# Patient Record
Sex: MALE | Race: WHITE | Employment: PART TIME | URBAN - METROPOLITAN AREA
[De-identification: names, ages, dates, MRNs, and addresses within clinical notes are randomized per-mention and may not be internally consistent; named-entity substitution may affect disease eponyms.]

---

## 2018-08-27 ENCOUNTER — HOSPITAL ENCOUNTER (EMERGENCY)
Age: 48
Discharge: HOME | End: 2018-08-27
Payer: COMMERCIAL

## 2018-08-27 ENCOUNTER — APPOINTMENT (OUTPATIENT)
Dept: GENERAL RADIOLOGY | Age: 48
End: 2018-08-27
Payer: COMMERCIAL

## 2018-08-27 VITALS
HEIGHT: 70 IN | HEART RATE: 87 BPM | TEMPERATURE: 98.6 F | DIASTOLIC BLOOD PRESSURE: 89 MMHG | BODY MASS INDEX: 27.06 KG/M2 | OXYGEN SATURATION: 98 % | WEIGHT: 189 LBS | SYSTOLIC BLOOD PRESSURE: 142 MMHG | RESPIRATION RATE: 16 BRPM

## 2018-08-27 DIAGNOSIS — M54.50 ACUTE RIGHT-SIDED LOW BACK PAIN WITHOUT SCIATICA: Primary | ICD-10-CM

## 2018-08-27 PROCEDURE — 96372 THER/PROPH/DIAG INJ SC/IM: CPT

## 2018-08-27 PROCEDURE — 99283 EMERGENCY DEPT VISIT LOW MDM: CPT

## 2018-08-27 PROCEDURE — 6360000002 HC RX W HCPCS: Performed by: PHYSICIAN ASSISTANT

## 2018-08-27 PROCEDURE — 72100 X-RAY EXAM L-S SPINE 2/3 VWS: CPT

## 2018-08-27 RX ORDER — CYCLOBENZAPRINE HCL 10 MG
10 TABLET ORAL 3 TIMES DAILY PRN
Qty: 21 TABLET | Refills: 0 | Status: SHIPPED | OUTPATIENT
Start: 2018-08-27 | End: 2018-09-06

## 2018-08-27 RX ORDER — DEXAMETHASONE SODIUM PHOSPHATE 10 MG/ML
10 INJECTION, SOLUTION INTRAMUSCULAR; INTRAVENOUS ONCE
Status: COMPLETED | OUTPATIENT
Start: 2018-08-27 | End: 2018-08-27

## 2018-08-27 RX ORDER — ORPHENADRINE CITRATE 30 MG/ML
60 INJECTION INTRAMUSCULAR; INTRAVENOUS ONCE
Status: COMPLETED | OUTPATIENT
Start: 2018-08-27 | End: 2018-08-27

## 2018-08-27 RX ORDER — KETOROLAC TROMETHAMINE 30 MG/ML
30 INJECTION, SOLUTION INTRAMUSCULAR; INTRAVENOUS ONCE
Status: COMPLETED | OUTPATIENT
Start: 2018-08-27 | End: 2018-08-27

## 2018-08-27 RX ORDER — NAPROXEN 500 MG/1
500 TABLET ORAL 2 TIMES DAILY
Qty: 14 TABLET | Refills: 0 | Status: SHIPPED | OUTPATIENT
Start: 2018-08-27 | End: 2018-09-03

## 2018-08-27 RX ADMIN — DEXAMETHASONE SODIUM PHOSPHATE 10 MG: 10 INJECTION INTRAMUSCULAR; INTRAVENOUS at 15:37

## 2018-08-27 RX ADMIN — KETOROLAC TROMETHAMINE 30 MG: 30 INJECTION, SOLUTION INTRAMUSCULAR at 15:38

## 2018-08-27 RX ADMIN — ORPHENADRINE CITRATE 60 MG: 30 INJECTION INTRAMUSCULAR; INTRAVENOUS at 15:38

## 2018-08-27 ASSESSMENT — PAIN SCALES - GENERAL
PAINLEVEL_OUTOF10: 8
PAINLEVEL_OUTOF10: 7

## 2018-08-27 ASSESSMENT — PAIN DESCRIPTION - ORIENTATION: ORIENTATION: LOWER

## 2018-08-27 ASSESSMENT — PAIN DESCRIPTION - DESCRIPTORS: DESCRIPTORS: RADIATING

## 2018-08-27 ASSESSMENT — PAIN DESCRIPTION - LOCATION: LOCATION: BACK

## 2018-08-27 NOTE — ED NOTES
Patient fell at work and landed on back coccyx area. Patient complains of pain down right leg and states \" toes tingle\". Patient denies and lose of bowel or bladder functios.       [address]  08/27/18 4003

## 2018-08-27 NOTE — ED PROVIDER NOTES
[facility]     Department of Emergency Medicine   ED  Provider Note  Admit Date/RoomTime: 8/27/2018  2:16 PM  ED Room: 20/20   Chief Complaint:   Back Pain (INDUSTRIAL FALL YESTERDAY)    History of Present Illness   Source of history provided by:  patient. History/Exam Limitations: none. Jamila Sanchez is a 53 y.o. old male who has a past medical history of: History reviewed. No pertinent past medical history. presents to the emergency department by private vehicle, for acute, aching and stiffness bilateral, worse on the right middle and lower lumbar spine and sacral spine pain without radiation, for 1 day(s) prior to arrival.  The pain was caused by fall yesterday. He has a history of no prior back problems. Since onset the symptoms have been constant and stable and mild-moderate in severity. There has been no bowel or bladder incontinence associated with the pain. There have been associated symptoms of pain in his right gluteal area which was also caused by the fall and denies any weakness, numbness, incontinence, dysuria, hematuria, abdominal pain, chest pain, pelvic pain or perianal numbness. The the pain is aggraveated by any movement and relieved by nothing. ROS   Pertinent positives and negatives are stated within HPI, all other systems reviewed and are negative. Past Surgical History:   Procedure Laterality Date    ABDOMEN SURGERY      APPENDECTOMY     Social History:  reports that he has never smoked. He does not have any smokeless tobacco history on file. He reports that he does not drink alcohol or use drugs. Family History: family history is not on file. Allergies: Patient has no known allergies.     Physical Exam           ED Triage Vitals [08/27/18 1418]   BP Temp Temp Source Pulse Resp SpO2 Height Weight   (!) 142/89 98.6 °F (37 °C) Oral 87 -- 98 % 5' 10\" (1.778 m) 189 lb (85.7 kg)      Oxygen Saturation Interpretation: Normal.    Constitutional:  Alert, development consistent with age. HEENT:  NC/NT. Airway patent. Neck:  Normal ROM. Supple. Respiratory:  Clear to auscultation and breath sounds equal.  CV:  Regular rate and rhythm, normal heart sounds, without pathological murmurs, ectopy, gallops, or rubs. GI:  Abdomen Soft, nontender, good bowel sounds. No firm or pulsatile mass. Back: lower lumbar spine and sacral spine right greater than left. Tenderness: Moderate. Swelling: no.              Range of Motion: full range with pain. CVA Tenderness: No.            Straight leg raising:  Bilateral negative. Skin:  no erythema, rash or swelling noted. Distal Function:              Motor deficit: none. Sensory deficit: none. Pulse deficit: none. Calf Tenderness:  No Bilateral.               Edema:  none Both lower extremity(s). Reflexes: Bilateral patellar normal.  Gait:  normal.  Integument:  Normal turgor. Warm, dry, without visible rash. Lymphatics: No lymphangitis or adenopathy noted. Neurological:  Oriented. Motor functions intact. Lab / Imaging Results   (All laboratory and radiology results have been personally reviewed by myself)  Labs:  No results found for this visit on 08/27/18. Imaging: All Radiology results interpreted by Radiologist unless otherwise noted. XR LUMBAR SPINE (2-3 VIEWS)   Final Result   No focal abnormality. ED Course / Medical Decision Making     Medications   ketorolac (TORADOL) injection 30 mg (30 mg Intramuscular Given 8/27/18 1538)   orphenadrine (NORFLEX) injection 60 mg (60 mg Intramuscular Given 8/27/18 1538)   dexamethasone (PF) (DECADRON) injection 10 mg (10 mg Intramuscular Given 8/27/18 1537)        Re-examination:  8/27/18       Time: 1600    Patients symptoms are improving. Consult(s):   None    Procedure(s):   none    Medical Decision Making:    Films were obtained And confirmed the above findings.  At this time the patient is without objective evidence of an acute process requiring inpatient management. Patient is released back to work without restrictions. He should follow up with corporate care tomorrow. Specific conditions for emergent return have been discussed and the patient verbalized understanding to return immediately for new or worsening symptoms. Counseling: The emergency provider has spoken with the patient and discussed todays results, in addition to providing specific details for the plan of care and counseling regarding the diagnosis and prognosis. Questions are answered at this time and they are agreeable with the plan. Assessment      1. Acute right-sided low back pain without sciatica       Plan   Discharge to home  Patient condition is good    New Medications     New Prescriptions    CYCLOBENZAPRINE (FLEXERIL) 10 MG TABLET    Take 1 tablet by mouth 3 times daily as needed for Muscle spasms    NAPROXEN (NAPROSYN) 500 MG TABLET    Take 1 tablet by mouth 2 times daily for 7 days     Electronically signed by Anibal Bell PA-C   DD: 8/27/18  **This report was transcribed using voice recognition software. Every effort was made to ensure accuracy; however, inadvertent computerized transcription errors may be present.   END OF ED PROVIDER NOTE        Anibal Bell PA-C  08/27/18 2491